# Patient Record
Sex: FEMALE | Race: WHITE | NOT HISPANIC OR LATINO | URBAN - METROPOLITAN AREA
[De-identification: names, ages, dates, MRNs, and addresses within clinical notes are randomized per-mention and may not be internally consistent; named-entity substitution may affect disease eponyms.]

---

## 2019-01-15 PROBLEM — Z00.00 ENCOUNTER FOR PREVENTIVE HEALTH EXAMINATION: Status: ACTIVE | Noted: 2019-01-15

## 2020-11-14 ENCOUNTER — OUTPATIENT (OUTPATIENT)
Dept: OUTPATIENT SERVICES | Facility: HOSPITAL | Age: 70
LOS: 1 days | End: 2020-11-14

## 2020-12-04 ENCOUNTER — NON-APPOINTMENT (OUTPATIENT)
Age: 70
End: 2020-12-04

## 2020-12-04 DIAGNOSIS — M19.90 UNSPECIFIED OSTEOARTHRITIS, UNSPECIFIED SITE: ICD-10-CM

## 2020-12-04 DIAGNOSIS — I10 ESSENTIAL (PRIMARY) HYPERTENSION: ICD-10-CM

## 2020-12-04 DIAGNOSIS — Z87.891 PERSONAL HISTORY OF NICOTINE DEPENDENCE: ICD-10-CM

## 2020-12-04 RX ORDER — CALCITRIOL 0.25 UG/1
0.25 CAPSULE, LIQUID FILLED ORAL
Refills: 0 | Status: ACTIVE | COMMUNITY

## 2020-12-04 RX ORDER — ALLOPURINOL 100 MG/1
100 TABLET ORAL DAILY
Refills: 0 | Status: ACTIVE | COMMUNITY

## 2020-12-04 RX ORDER — OMEPRAZOLE 20 MG/1
20 CAPSULE, DELAYED RELEASE ORAL
Refills: 0 | Status: ACTIVE | COMMUNITY

## 2020-12-04 RX ORDER — INDOMETHACIN 75 MG/1
75 CAPSULE, EXTENDED RELEASE ORAL
Refills: 0 | Status: ACTIVE | COMMUNITY

## 2020-12-04 RX ORDER — DILTIAZEM HYDROCHLORIDE 300 MG/1
300 CAPSULE, EXTENDED RELEASE ORAL
Refills: 0 | Status: ACTIVE | COMMUNITY

## 2021-01-04 ENCOUNTER — RX RENEWAL (OUTPATIENT)
Age: 71
End: 2021-01-04

## 2021-05-13 ENCOUNTER — RX RENEWAL (OUTPATIENT)
Age: 71
End: 2021-05-13

## 2021-09-23 ENCOUNTER — APPOINTMENT (OUTPATIENT)
Dept: PULMONOLOGY | Facility: CLINIC | Age: 71
End: 2021-09-23

## 2021-09-23 ENCOUNTER — NON-APPOINTMENT (OUTPATIENT)
Age: 71
End: 2021-09-23

## 2021-09-23 ENCOUNTER — APPOINTMENT (OUTPATIENT)
Dept: PULMONOLOGY | Facility: CLINIC | Age: 71
End: 2021-09-23
Payer: MEDICARE

## 2021-09-23 VITALS
SYSTOLIC BLOOD PRESSURE: 156 MMHG | HEART RATE: 69 BPM | OXYGEN SATURATION: 97 % | WEIGHT: 230 LBS | DIASTOLIC BLOOD PRESSURE: 78 MMHG | HEIGHT: 62 IN | BODY MASS INDEX: 42.33 KG/M2

## 2021-09-23 DIAGNOSIS — R91.8 OTHER NONSPECIFIC ABNORMAL FINDING OF LUNG FIELD: ICD-10-CM

## 2021-09-23 PROCEDURE — 94010 BREATHING CAPACITY TEST: CPT

## 2021-09-23 PROCEDURE — 94729 DIFFUSING CAPACITY: CPT

## 2021-09-23 PROCEDURE — 94727 GAS DIL/WSHOT DETER LNG VOL: CPT

## 2021-09-23 PROCEDURE — 99214 OFFICE O/P EST MOD 30 MIN: CPT | Mod: 25

## 2021-09-23 NOTE — DISCUSSION/SUMMARY
[FreeTextEntry1] : Ms. Nunn has progressive dyspnea on exertion.  Her history does not seem to indicate worsening obstructive airways disease.  Her PFTs show mild restrictive disease but she does not claim any weight gain.  Her room air O2 saturations are normal.  It is possible that there could be some cardiac dysfunction I have asked her to obtain a recent echocardiogram and compare it with the one done in January 2021.  We will also obtain a CAT scan of the chest to see if the opacity has resolved and to fully define the anatomy as she had no significant pneumonia symptoms at that time.  Patient understands and agrees with this plan of care.

## 2021-09-23 NOTE — PROCEDURE
[FreeTextEntry1] : Room air O2 saturation is 97% at rest room air O2 saturation 95% with exercise.\par \par Chest x-ray 3/20/2021 lingula pneumonia.\par \par Pulmonary function test revealed mild restrictive lung disease.

## 2021-09-23 NOTE — HISTORY OF PRESENT ILLNESS
[Former] : former [Never] : never [TextBox_4] : 70 female with mild inter asthma for yearly fu\par co sob for months  and cant walk for several feet \par no  chest pain or tightness no wheeze no cough no phlegm sl am\par +leg swelling\par no orthopnea only with exertion/stairs \par precipitating factors none\par no wt gain\par lingula pneumonia march covid negative  and treated with zpak and steroids\par no snore +thrashing\par no headache or dry mouth\par no daytime hypersomnolence\par no sedentary events [TextBox_11] : .5 [TextBox_13] : 10 [YearQuit] : 1975

## 2021-09-23 NOTE — REASON FOR VISIT
[Follow-Up] : a follow-up visit [Asthma] : asthma [Pneumonia] : pneumonia [Shortness of Breath] : shortness of breath [TextBox_44] : Pt states that her SOB is worsening - Pt states that this has worsened since her cardiac catheterization in November.  Pt denies any cough or wheeze.  Pt had PFT today.

## 2021-10-01 ENCOUNTER — NON-APPOINTMENT (OUTPATIENT)
Age: 71
End: 2021-10-01

## 2021-10-04 ENCOUNTER — NON-APPOINTMENT (OUTPATIENT)
Age: 71
End: 2021-10-04

## 2021-10-07 ENCOUNTER — NON-APPOINTMENT (OUTPATIENT)
Age: 71
End: 2021-10-07

## 2021-10-11 ENCOUNTER — APPOINTMENT (OUTPATIENT)
Dept: PULMONOLOGY | Facility: CLINIC | Age: 71
End: 2021-10-11
Payer: MEDICARE

## 2021-10-11 VITALS
OXYGEN SATURATION: 96 % | HEIGHT: 62 IN | WEIGHT: 230 LBS | TEMPERATURE: 96.9 F | DIASTOLIC BLOOD PRESSURE: 73 MMHG | HEART RATE: 75 BPM | BODY MASS INDEX: 42.33 KG/M2 | SYSTOLIC BLOOD PRESSURE: 132 MMHG

## 2021-10-11 DIAGNOSIS — M45.9 ANKYLOSING SPONDYLITIS OF UNSPECIFIED SITES IN SPINE: ICD-10-CM

## 2021-10-11 PROCEDURE — 99214 OFFICE O/P EST MOD 30 MIN: CPT

## 2021-10-11 NOTE — HISTORY OF PRESENT ILLNESS
[Former] : former [Never] : never [TextBox_4] : 71 female for fu sob\par feels poor with exertion no cp no tightness\par no wheeze cough or phlegm some am phlegm\par no wt loss no pain\par overall wt stable\par no snore or witness apnea\par no snort  no daytime hypersomnolence\par has not tried albuterol during this time [TextBox_11] : 0.5 [TextBox_13] : 10 [YearQuit] : 1975

## 2021-10-11 NOTE — REASON FOR VISIT
[Follow-Up] : a follow-up visit [Asthma] : asthma [Cough] : cough [Shortness of Breath] : shortness of breath [TextBox_44] : 3 weeks. ECHO, CT and PFT results. Pt reports that she experiences a wet cough and SOB on exertion. No other pulmonary complaints.

## 2021-10-11 NOTE — DISCUSSION/SUMMARY
[FreeTextEntry1] : Ms Bedoya has dyspnea on exertion. The etiology is unclear. Her exam history and pulmonary function test do not seem to correlate with obstructive airways disease as being the cause. I reviewed the CAT scan myself and did not see significant interstitial disease although the patient does have a history of ankylosing spondylitis. Her echocardiogram does show minimal decrease in her left ventricular function but she has RVSP of 49 consistent with pulmonary hypertension. I had a thorough discussion with her regarding pulmonary hypertension and its possible causes. Our first step is to perform a sleep study to see if she has obstructive sleep apnea although she denies all symptoms. If this is -1 must consider pulmonary hypertension from her underlying ankylosing spondylitis and a possible referral to a tertiary care center will be indicated. At this time we will continue her inhalers and albuterol as needed and in fact she will try it with exertion and see if there is any improvement. There is been thoroughly discussed with the patient she understands and agrees\par The patient understands and agrees with plan of care.\par Today's office visit encompassed 32 minutes. I conducted an extensive history ,physical exam and reviewed diagnosis and treatment options  including diagnostic tests,radiologic studies including  cat scans  and the use of prescription medication.

## 2021-10-11 NOTE — PHYSICAL EXAM
[No Acute Distress] : no acute distress [Normal Oropharynx] : normal oropharynx [Normal Appearance] : normal appearance [No Neck Mass] : no neck mass [Normal Rate/Rhythm] : normal rate/rhythm [Normal S1, S2] : normal s1, s2 [No Murmurs] : no murmurs [No Resp Distress] : no resp distress [Clear to Auscultation Bilaterally] : clear to auscultation bilaterally [No Abnormalities] : no abnormalities [Benign] : benign [Normal Gait] : normal gait [No Clubbing] : no clubbing [No Cyanosis] : no cyanosis [No Edema] : no edema [FROM] : FROM [Normal Color/ Pigmentation] : normal color/ pigmentation [No Focal Deficits] : no focal deficits [Oriented x3] : oriented x3 [Normal Affect] : normal affect [TextBox_2] : heavyset female

## 2021-10-11 NOTE — PROCEDURE
[FreeTextEntry1] : CAT scan of chest 9/29/2021 no enlarged lymph nodes endobronchial density peripheral right middle lobe bronchus with patchy density adjacent to the right middle lobe groundglass nodule and some groundglass attenuation right lower lobe my review minimal CAT scan changes\par Echocardiogram mild decrease LV function EF 45 to 50% mild pulmonary hypertension R VSP 49

## 2021-11-12 ENCOUNTER — NON-APPOINTMENT (OUTPATIENT)
Age: 71
End: 2021-11-12

## 2021-11-12 ENCOUNTER — APPOINTMENT (OUTPATIENT)
Dept: OPHTHALMOLOGY | Facility: CLINIC | Age: 71
End: 2021-11-12
Payer: MEDICARE

## 2021-11-12 PROCEDURE — 92014 COMPRE OPH EXAM EST PT 1/>: CPT

## 2021-12-21 ENCOUNTER — NON-APPOINTMENT (OUTPATIENT)
Age: 71
End: 2021-12-21

## 2021-12-29 ENCOUNTER — APPOINTMENT (OUTPATIENT)
Dept: PULMONOLOGY | Facility: CLINIC | Age: 71
End: 2021-12-29
Payer: MEDICARE

## 2021-12-29 VITALS
HEIGHT: 62 IN | BODY MASS INDEX: 44.16 KG/M2 | DIASTOLIC BLOOD PRESSURE: 82 MMHG | SYSTOLIC BLOOD PRESSURE: 162 MMHG | WEIGHT: 240 LBS | OXYGEN SATURATION: 96 % | HEART RATE: 71 BPM | TEMPERATURE: 97.6 F

## 2021-12-29 PROCEDURE — 99214 OFFICE O/P EST MOD 30 MIN: CPT

## 2021-12-29 NOTE — PROCEDURE
[FreeTextEntry1] : Home sleep study reveals AHI of 8.9 consistent with mild obstructive sleep apnea.  Lowest O2 saturation of 79%.

## 2021-12-29 NOTE — DISCUSSION/SUMMARY
[FreeTextEntry1] : Ms. Bedoya has dyspnea on exertion.  She is being treated for obstructive airway disease and her symptoms do not seem to be consistent with worsening asthma.  She did have an elevated right ventricular pressure of 49 on her echocardiogram and an sleep study with mild obstructive apnea with decreased O2 saturation 79%.  Persistent hypoxemia can cause pulmonary hypertension.  However it is only mild on her sleep study and it is not clear that this can definitely lead to pulmonary hypertension.  The patient also is obese and certainly the extra weight alone can cause a dyspnea that she is experiencing.  After extensive discussion with Ms. Nunn the decision was she will be seeing her cardiologist in 2 weeks and will discuss the pulmonary hypertension with her.  I favor pulmonary hypertension evaluation and Dr. Faulkner Fairdealing was recommended.  If he feels the mild obstructive sleep apnea can cause this disease then I recommend therapy.  At this time we will continue the Symbicort and montelukast.  The patient has ankylosing spondylitis and her pulmonary function test revealed minimal restrictive lung disease.  This certainly could be from her weight/body shape as is no definite interstitial lung disease present the patient understands and agrees with this plan of care.\par The patient understands and agrees with plan of care.\par Today's office visit encompassed 32 minutes. I conducted an extensive history ,physical exam and reviewed diagnosis and treatment options  including diagnostic tests,radiologic studies including  cat scans  and the use of prescription medication.

## 2021-12-29 NOTE — HISTORY OF PRESENT ILLNESS
[Former] : former [Never] : never [TextBox_4] : 71 female hx asthma and dyspnea\par feels fair still some dyspnea on exertion  no chest pain no tightness\par no wheeze occa am phlegm\par only with exertion  and walking \par feels good at rest \par no nite awakening\par  [TextBox_11] : .75 [TextBox_13] : 10 [YearQuit] : 1975

## 2021-12-29 NOTE — REASON FOR VISIT
[Follow-Up] : a follow-up visit [Asthma] : asthma [Shortness of Breath] : shortness of breath [TextBox_44] : Pt states that she has had worsened SOB over the past 6 months.  Pt denies any other adverse pulmonary issues.  Pt in office to evaluate recent sleep study - copy scanned to chart.

## 2022-01-05 ENCOUNTER — OUTPATIENT (OUTPATIENT)
Dept: OUTPATIENT SERVICES | Facility: HOSPITAL | Age: 72
LOS: 1 days | End: 2022-01-05

## 2022-01-12 ENCOUNTER — TRANSCRIPTION ENCOUNTER (OUTPATIENT)
Age: 72
End: 2022-01-12

## 2022-01-15 ENCOUNTER — OUTPATIENT (OUTPATIENT)
Dept: OUTPATIENT SERVICES | Facility: HOSPITAL | Age: 72
LOS: 1 days | End: 2022-01-15

## 2022-01-27 DIAGNOSIS — R30.0 DYSURIA: ICD-10-CM

## 2022-03-17 ENCOUNTER — NON-APPOINTMENT (OUTPATIENT)
Age: 72
End: 2022-03-17

## 2022-03-18 ENCOUNTER — APPOINTMENT (OUTPATIENT)
Dept: PULMONOLOGY | Facility: CLINIC | Age: 72
End: 2022-03-18
Payer: MEDICARE

## 2022-03-18 VITALS
HEIGHT: 60 IN | DIASTOLIC BLOOD PRESSURE: 76 MMHG | OXYGEN SATURATION: 93 % | HEART RATE: 66 BPM | TEMPERATURE: 98 F | BODY MASS INDEX: 48.1 KG/M2 | WEIGHT: 245 LBS | SYSTOLIC BLOOD PRESSURE: 158 MMHG | RESPIRATION RATE: 15 BRPM

## 2022-03-18 VITALS
OXYGEN SATURATION: 96 % | HEART RATE: 56 BPM | WEIGHT: 245 LBS | HEIGHT: 60.2 IN | TEMPERATURE: 98.2 F | BODY MASS INDEX: 47.47 KG/M2

## 2022-03-18 DIAGNOSIS — I89.0 LYMPHEDEMA, NOT ELSEWHERE CLASSIFIED: ICD-10-CM

## 2022-03-18 PROCEDURE — 36415 COLL VENOUS BLD VENIPUNCTURE: CPT

## 2022-03-18 PROCEDURE — 94726 PLETHYSMOGRAPHY LUNG VOLUMES: CPT

## 2022-03-18 PROCEDURE — 94618 PULMONARY STRESS TESTING: CPT

## 2022-03-18 PROCEDURE — ZZZZZ: CPT

## 2022-03-18 PROCEDURE — 99215 OFFICE O/P EST HI 40 MIN: CPT | Mod: 25

## 2022-03-18 PROCEDURE — 94060 EVALUATION OF WHEEZING: CPT

## 2022-03-18 PROCEDURE — 99205 OFFICE O/P NEW HI 60 MIN: CPT | Mod: 25

## 2022-03-18 PROCEDURE — 94729 DIFFUSING CAPACITY: CPT

## 2022-03-18 NOTE — PHYSICAL EXAM
[No Acute Distress] : no acute distress [III] : Mallampati Class: III [Normal Appearance] : normal appearance [No Neck Mass] : no neck mass [Normal Rate/Rhythm] : normal rate/rhythm [Normal S1, S2] : normal s1, s2 [No Murmurs] : no murmurs [No Resp Distress] : no resp distress [Clear to Auscultation Bilaterally] : clear to auscultation bilaterally [No Abnormalities] : no abnormalities [Benign] : benign [No Clubbing] : no clubbing [No Cyanosis] : no cyanosis [FROM] : FROM [Normal Color/ Pigmentation] : normal color/ pigmentation [No Focal Deficits] : no focal deficits [Oriented x3] : oriented x3 [Normal Affect] : normal affect [TextBox_99] : waddmaryam gait [TextBox_105] : lymphadema

## 2022-03-18 NOTE — DISCUSSION/SUMMARY
[FreeTextEntry1] : ---Assessment plan----------The patient has been referred here for further opinion regarding pulmonary problem,\par 70 yo with  pulm htn, HTN, obesity, Rose Marie (untreated) ankylosis spondylosis, lymphadema, seasonal asthma\par 1) CT chest and abd/pelvis (given lymphadema)\par 2) labs drawn in our office today\par 3) OSA_ get in lab [facility]\par 4) asthma- continue symbicort - albuterol as needed\par 5) f/u in 6-8 weeks\par 6) obtain cardiac cath results and chest CD for me to review\par \par \par Thanks for allowing  me to participate  in the care of this patient.  Patient at this time  will follow  the above mentioned recommendations and return back for follow up visit. If you have any questions  I can be reached  at # [phone] (office).\par \par Annmarie Monteiro MD, FCCP \par Director, Pulmonary Hypertension Program \par Health system \par Division of Pulmonary, Critical Care and Sleep Medicine \par  Professor of Medicine \par [facility]\par \par TARSHA SaenzC\par

## 2022-03-18 NOTE — REVIEW OF SYSTEMS
[Dyspnea] : dyspnea [SOB on Exertion] : sob on exertion [Negative] : Endocrine [TextBox_94] : as per hpi

## 2022-03-18 NOTE — HISTORY OF PRESENT ILLNESS
[TextBox_4] : This letter  is regarding your patient  who  attended pulmonary out patient office today.  I have reviewed  patient's  past history, social history, family history and medication list. I also  reviewed nurse practitioners/ and fellows  notes and assessment and agree with it.  \par The patient was referred by  for pulm htn, HTN, obesity, Rose Marie (untreated) ankylosis spondylosis, lymphadema, seasonal asthma\par \par  \par ------No history of , fever, chills , rigors, chest pain, or hemoptysis. Questionable history of Raynaud's phenomenon. No h/o significant weight loss in last few months. No history of liver dysfunction , collagen vascular disorder or chronic thromboembolic disease. I would classify the patient's dyspnea as WHO  FUNCTIONAL CLASS II--------\par \par ----Echo  date-2021 est pasp 49mmHg-----\par ----Pft date- 3/2022 normal lung volumes--------\par ----Ct scan date--2021 ground glass attenuation, endobronchial density-----\par ----Cath date--unavailable ([place])----------\par \par Sleep study AHI 8.9\par \par c/o exertional dyspnea since  last summer - Dr Gagandeep hall switched her  from metoprolol to atenolol and this helped with her sleep apnea

## 2022-03-21 DIAGNOSIS — E87.5 HYPERKALEMIA: ICD-10-CM

## 2022-03-21 DIAGNOSIS — E34.9 ENDOCRINE DISORDER, UNSPECIFIED: ICD-10-CM

## 2022-03-21 LAB
25(OH)D3 SERPL-MCNC: 52.8 NG/ML
A1AT SERPL-MCNC: 149 MG/DL
ALBUMIN SERPL ELPH-MCNC: 4.3 G/DL
ALP BLD-CCNC: 111 U/L
ALT SERPL-CCNC: 14 U/L
ANION GAP SERPL CALC-SCNC: 15 MMOL/L
APTT BLD: 25.5 SEC
AST SERPL-CCNC: 11 U/L
BILIRUB SERPL-MCNC: 0.5 MG/DL
BUN SERPL-MCNC: 44 MG/DL
CALCIUM SERPL-MCNC: 10.1 MG/DL
CALCIUM SERPL-MCNC: 10.1 MG/DL
CHLORIDE SERPL-SCNC: 110 MMOL/L
CO2 SERPL-SCNC: 17 MMOL/L
COVID-19 NUCLEOCAPSID  GAM ANTIBODY INTERPRETATION: NEGATIVE
COVID-19 SPIKE DOMAIN ANTIBODY INTERPRETATION: POSITIVE
CREAT SERPL-MCNC: 1.41 MG/DL
CRP SERPL-MCNC: 9 MG/L
DEPRECATED KAPPA LC FREE/LAMBDA SER: 1.32 RATIO
EGFR: 40 ML/MIN/1.73M2
FERRITIN SERPL-MCNC: 69 NG/ML
GLUCOSE SERPL-MCNC: 108 MG/DL
HCYS SERPL-MCNC: 13.6 UMOL/L
IGA SER QL IEP: 110 MG/DL
IGG SER QL IEP: 754 MG/DL
IGM SER QL IEP: 82 MG/DL
INR PPP: 0.98 RATIO
KAPPA LC CSF-MCNC: 1.89 MG/DL
KAPPA LC SERPL-MCNC: 2.5 MG/DL
M TB IFN-G BLD-IMP: NEGATIVE
NT-PROBNP SERPL-MCNC: 183 PG/ML
PARATHYROID HORMONE INTACT: 156 PG/ML
PHOSPHATE SERPL-MCNC: 3.2 MG/DL
POTASSIUM SERPL-SCNC: 5.5 MMOL/L
PROT SERPL-MCNC: 6.4 G/DL
PT BLD: 11.4 SEC
QUANTIFERON TB PLUS MITOGEN MINUS NIL: 9.98 IU/ML
QUANTIFERON TB PLUS NIL: 0.02 IU/ML
QUANTIFERON TB PLUS TB1 MINUS NIL: -0.01 IU/ML
QUANTIFERON TB PLUS TB2 MINUS NIL: -0.01 IU/ML
RHEUMATOID FACT SER QL: 12 IU/ML
SARS-COV-2 AB SERPL IA-ACNC: 206 U/ML
SARS-COV-2 AB SERPL QL IA: 0.17 INDEX
SODIUM SERPL-SCNC: 142 MMOL/L
T3FREE SERPL-MCNC: 2.24 PG/ML
T4 FREE SERPL-MCNC: 1.4 NG/DL
TSH SERPL-ACNC: 1.69 UIU/ML
URATE SERPL-MCNC: 6.6 MG/DL
VIT B12 SERPL-MCNC: 428 PG/ML

## 2022-03-22 LAB
ANACR T: NEGATIVE
CARDIOLIPIN AB SER IA-ACNC: NEGATIVE
CCP AB SER IA-ACNC: <8 UNITS
IGE SER-MCNC: 78 KU/L
RF+CCP IGG SER-IMP: NEGATIVE

## 2022-03-23 LAB
ENA SCL70 IGG SER IA-ACNC: <0.2 AL
ENA SS-A AB SER IA-ACNC: <0.2 AL
ENA SS-B AB SER IA-ACNC: <0.2 AL

## 2022-03-24 ENCOUNTER — APPOINTMENT (OUTPATIENT)
Dept: PULMONOLOGY | Facility: CLINIC | Age: 72
End: 2022-03-24
Payer: MEDICARE

## 2022-03-24 VITALS
BODY MASS INDEX: 44.16 KG/M2 | DIASTOLIC BLOOD PRESSURE: 83 MMHG | WEIGHT: 240 LBS | SYSTOLIC BLOOD PRESSURE: 154 MMHG | TEMPERATURE: 97 F | HEIGHT: 62 IN | OXYGEN SATURATION: 96 % | HEART RATE: 68 BPM

## 2022-03-24 PROCEDURE — 99214 OFFICE O/P EST MOD 30 MIN: CPT

## 2022-03-24 NOTE — REASON FOR VISIT
[Follow-Up] : a follow-up visit [Pulmonary Hypertension] : pulmonary hypertension [Shortness of Breath] : shortness of breath [TextBox_44] : 3 months, SOB on exertion , PFT test done on March 18

## 2022-03-24 NOTE — PHYSICAL EXAM
[No Acute Distress] : no acute distress [Normal Oropharynx] : normal oropharynx [Normal Appearance] : normal appearance [No Neck Mass] : no neck mass [Normal Rate/Rhythm] : normal rate/rhythm [Normal S1, S2] : normal s1, s2 [No Murmurs] : no murmurs [No Resp Distress] : no resp distress [Clear to Auscultation Bilaterally] : clear to auscultation bilaterally [No Abnormalities] : no abnormalities [Benign] : benign [Normal Gait] : normal gait [No Clubbing] : no clubbing [No Cyanosis] : no cyanosis [No Edema] : no edema [FROM] : FROM [Normal Color/ Pigmentation] : normal color/ pigmentation [No Focal Deficits] : no focal deficits [Oriented x3] : oriented x3 [Normal Affect] : normal affect [TextBox_2] : Obese patient no acute respiratory distress

## 2022-03-24 NOTE — DISCUSSION/SUMMARY
[FreeTextEntry1] : Ms. Bedoya has mild pulmonary hypertension on her echo.  She was seen by Dr. Monteiro at [facility] and needed several tests which showed no definite etiology.  He is awaiting a repeat echocardiogram in the future.  The patient feels much better since she stopped metoprolol.  This is a selective beta-blocker and usually does not cause obstructive airways disease but she is overall feeling better so we will no longer use metoprolol.  We will continue the Symbicort and the Singulair as prescribed.  The patient has mild obstructive sleep apnea on a home sleep study.  I recommend positive pressure breathing and the patient understands and agrees.  We will attempt to obtain the equipment (visit several months back up and supplies) and see the patient in approximately 3 months to check on her compliance.  The patient understands and agrees with this plan of care.\par The patient understands and agrees with plan of care.\par Today's office visit encompassed 32 minutes. I conducted an extensive history ,physical exam and reviewed diagnosis and treatment options  including diagnostic tests,radiologic studies including  cat scans  and the use of prescription medication.

## 2022-03-24 NOTE — HISTORY OF PRESENT ILLNESS
[Former] : former [Never] : never [TextBox_4] : 71 female  feels better than prior \par stopped metoprolol and improved  no sob no cough  no wheeze no chest pain no tightness\par Dr Cayetano THOMAS  and stated  no pulm hypertension cause  and for fu echo and ct studies next month\par breathing is improved no wheeze no chest pain\par  [TextBox_11] : .75 [TextBox_13] : 10 [YearQuit] : 1975

## 2022-04-05 LAB — T4 FREE SERPL DIALY-MCNC: 1.4 NG/DL

## 2022-04-26 DIAGNOSIS — R16.0 HEPATOMEGALY, NOT ELSEWHERE CLASSIFIED: ICD-10-CM

## 2022-05-24 ENCOUNTER — APPOINTMENT (OUTPATIENT)
Dept: PULMONOLOGY | Facility: CLINIC | Age: 72
End: 2022-05-24
Payer: MEDICARE

## 2022-05-24 DIAGNOSIS — J45.909 UNSPECIFIED ASTHMA, UNCOMPLICATED: ICD-10-CM

## 2022-05-24 DIAGNOSIS — I27.20 PULMONARY HYPERTENSION, UNSPECIFIED: ICD-10-CM

## 2022-05-24 PROCEDURE — 99443: CPT

## 2022-05-24 NOTE — DISCUSSION/SUMMARY
[FreeTextEntry1] : ---Assessment plan----------The patient has been referred here for further opinion regarding pulmonary problem,\par 72 yo with  pulm htn, HTN, obesity, Mona (untreated) ankylosis spondylosis, lymphadema, seasonal asthma\par 1) CT chest and abd/pelvis (given lymphadema)\par 2)  asthma- continue symbicort - albuterol as needed\par 3) MONA- awaiting cpap ordered by another MD\par 4) obtain cardiac cath results and chest CD for me to review\par 5) pt wishes to continue f/u with DR Gill- declined further f/u with us\par \par \par Thanks for allowing  me to participate  in the care of this patient.  Patient at this time  will follow  the above mentioned recommendations and return back for follow up visit. If you have any questions  I can be reached  at # [phone] (office).\par \par Annmarie Monteiro MD, FCCP \par Director, Pulmonary Hypertension Program \par [facility] \par Division of Pulmonary, Critical Care and Sleep Medicine \par  Professor of Medicine \par [employer] [employer] [employer]\par \par KARYN Saenz-C\par

## 2022-05-24 NOTE — HISTORY OF PRESENT ILLNESS
[Home] : at home, [unfilled] , at the time of the visit. [Medical Office: ([facility])___] : at the medical office located in  [Verbal consent obtained from patient] : the patient, [unfilled] [TextBox_4] : This letter  is regarding your patient  who  attended pulmonary out patient office today.  I have reviewed  patient's  past history, social history, family history and medication list. I also  reviewed nurse practitioners/ and fellows  notes and assessment and agree with it.  \par The patient was referred by  for pulm htn, HTN, obesity, Mona (untreated) ankylosis spondylosis, lymphadema, seasonal asthma\par \par  \par ------No history of , fever, chills , rigors, chest pain, or hemoptysis. Questionable history of Raynaud's phenomenon. No h/o significant weight loss in last few months. No history of liver dysfunction , collagen vascular disorder or chronic thromboembolic disease. I would classify the patient's dyspnea as WHO  FUNCTIONAL CLASS II--------\par \par ----Echo  date-2021 est pasp 49mmHg-----\par ----Pft date- 3/2022 normal lung volumes--------\par ----Ct scan date--2021 ground glass attenuation, endobronchial density-----\par ----Cath date--unavailable ([place])----------\par \par Sleep study AHI 8.9\par \par c/o exertional dyspnea since  last summer - Dr Donis cardiology switched her  from metoprolol to atenolol and this helped with her sleep apnea\par \par 5/24/2022  Asthma stable from resp perspective on symbicort, MONA- awaits cpap

## 2022-06-23 ENCOUNTER — APPOINTMENT (OUTPATIENT)
Dept: PULMONOLOGY | Facility: CLINIC | Age: 72
End: 2022-06-23

## 2022-08-26 ENCOUNTER — OUTPATIENT (OUTPATIENT)
Dept: OUTPATIENT SERVICES | Facility: HOSPITAL | Age: 72
LOS: 1 days | End: 2022-08-26

## 2022-08-26 DIAGNOSIS — R30.0 DYSURIA: ICD-10-CM

## 2022-09-20 ENCOUNTER — APPOINTMENT (OUTPATIENT)
Dept: PULMONOLOGY | Facility: CLINIC | Age: 72
End: 2022-09-20

## 2022-09-20 VITALS
SYSTOLIC BLOOD PRESSURE: 152 MMHG | HEIGHT: 62 IN | WEIGHT: 240 LBS | BODY MASS INDEX: 44.16 KG/M2 | TEMPERATURE: 96.9 F | OXYGEN SATURATION: 97 % | DIASTOLIC BLOOD PRESSURE: 86 MMHG | HEART RATE: 78 BPM

## 2022-09-20 DIAGNOSIS — R06.09 OTHER FORMS OF DYSPNEA: ICD-10-CM

## 2022-09-20 DIAGNOSIS — E66.01 MORBID (SEVERE) OBESITY DUE TO EXCESS CALORIES: ICD-10-CM

## 2022-09-20 PROCEDURE — 99214 OFFICE O/P EST MOD 30 MIN: CPT

## 2022-09-20 RX ORDER — ATENOLOL 25 MG/1
25 TABLET ORAL
Refills: 0 | Status: ACTIVE | COMMUNITY

## 2022-09-20 NOTE — HISTORY OF PRESENT ILLNESS
[Former] : former [Never] : never [CPAP:] : CPAP [Nasal mask] : nasal mask [TextBox_4] : 71 female hx asthma and obstructive sleep apnea  for fu visit\par today feels fair \par co persistent dyspnea  suing apap  and  no change in dyspnea\par no chest pain n tightness\par no wheeze \par cont on symbicort  and used albuterol  when overnite in hotel  and needed albuterol bec of coughing\par  [TextBox_11] : .75 [TextBox_13] : 10 [YearQuit] : [birth year] [TextBox_133] : 75 [TextBox_158] : [facility]

## 2022-09-20 NOTE — DISCUSSION/SUMMARY
[FreeTextEntry1] : Ms. Bedoya has a mild asthma.  She is well controlled on Symbicort and montelukast.  Recent pulmonary function test showed no acute abnormalities.  The patient also has mild sleep apnea.  She has been using machine and denies any change in any symptoms.  She will be mailing her ST card to [employer] and once a compliance report is obtained we will review with her.  I think the majority of her dyspnea on exertion is her obesity.  She is 5 foot 2 and weighs 240 pounds.  An extensive discussion with her regarding weight loss and she will make a concerted effort.\par The patient understands and agrees with plan of care.\par Today's office visit encompassed 32 minutes. I conducted an extensive history ,physical exam and reviewed diagnosis and treatment options  including diagnostic tests,radiologic studies including  cat scans  and the use of prescription medication.

## 2022-09-20 NOTE — REASON FOR VISIT
[Follow-Up] : a follow-up visit [Asthma] : asthma [Sleep Apnea] : sleep apnea [Pulmonary Hypertension] : pulmonary hypertension [Shortness of Breath] : shortness of breath [TextBox_44] : is presenting for a 3 month follow up.  Patient experiencing SOB with exertion, and complains that her CPAP isn't really doing much assist with sleeping.

## 2022-11-04 ENCOUNTER — NON-APPOINTMENT (OUTPATIENT)
Age: 72
End: 2022-11-04

## 2022-11-10 ENCOUNTER — APPOINTMENT (OUTPATIENT)
Dept: PULMONOLOGY | Facility: CLINIC | Age: 72
End: 2022-11-10

## 2022-11-10 VITALS
HEART RATE: 63 BPM | HEIGHT: 62 IN | DIASTOLIC BLOOD PRESSURE: 70 MMHG | OXYGEN SATURATION: 97 % | TEMPERATURE: 97.6 F | SYSTOLIC BLOOD PRESSURE: 145 MMHG | WEIGHT: 240 LBS | BODY MASS INDEX: 44.16 KG/M2

## 2022-11-10 DIAGNOSIS — R93.89 ABNORMAL FINDINGS ON DIAGNOSTIC IMAGING OF OTHER SPECIFIED BODY STRUCTURES: ICD-10-CM

## 2022-11-10 PROCEDURE — 99214 OFFICE O/P EST MOD 30 MIN: CPT

## 2022-11-10 RX ORDER — ALBUTEROL SULFATE 90 UG/1
108 (90 BASE) INHALANT RESPIRATORY (INHALATION)
Qty: 1 | Refills: 3 | Status: ACTIVE | COMMUNITY
Start: 1900-01-01 | End: 1900-01-01

## 2022-11-10 RX ORDER — ALBUTEROL SULFATE 2.5 MG/3ML
(2.5 MG/3ML) SOLUTION RESPIRATORY (INHALATION)
Qty: 120 | Refills: 3 | Status: ACTIVE | COMMUNITY
Start: 1900-01-01 | End: 1900-01-01

## 2022-11-10 RX ORDER — LIDOCAINE 5% 700 MG/1
5 PATCH TOPICAL
Qty: 90 | Refills: 0 | Status: ACTIVE | COMMUNITY
Start: 2022-11-08

## 2022-11-10 RX ORDER — ATENOLOL 50 MG/1
50 TABLET ORAL
Qty: 90 | Refills: 0 | Status: ACTIVE | COMMUNITY
Start: 2022-10-27

## 2022-11-10 RX ORDER — ROSUVASTATIN CALCIUM 5 MG/1
5 TABLET, FILM COATED ORAL
Qty: 90 | Refills: 0 | Status: ACTIVE | COMMUNITY
Start: 2022-10-27

## 2022-11-10 NOTE — PHYSICAL EXAM
[No Acute Distress] : no acute distress [Normal Oropharynx] : normal oropharynx [Normal Appearance] : normal appearance [No Neck Mass] : no neck mass [Normal Rate/Rhythm] : normal rate/rhythm [Normal S1, S2] : normal s1, s2 [No Murmurs] : no murmurs [No Resp Distress] : no resp distress [Clear to Auscultation Bilaterally] : clear to auscultation bilaterally [No Abnormalities] : no abnormalities [Benign] : benign [Normal Gait] : normal gait [No Clubbing] : no clubbing [No Cyanosis] : no cyanosis [FROM] : FROM [Normal Color/ Pigmentation] : normal color/ pigmentation [No Focal Deficits] : no focal deficits [Oriented x3] : oriented x3 [Normal Affect] : normal affect [TextBox_2] : Heavyset patient no respiratory distress [TextBox_105] : Chronic leg edema

## 2022-11-10 NOTE — DISCUSSION/SUMMARY
[FreeTextEntry1] : Ms. Bedoya has asthma and an allergy to cats.  She recently exposed to a cat at her daughter's house and had to use her prednisone for the exacerbation.  The patient was seen in a walk-in clinic and told she had pneumonia.  She did not have any symptoms and pneumonia knows her x-ray showed pneumonia but she completed course of antibiotics.  She is feeling well at this time.  She is back to baseline therapy and I would continue same.  I told her when she is exposed to the cats next time she should take an antihistamine i.e. Zyrtec or Allegra daily to try to blunt the allergic response.\par The patient understands and agrees with plan of care.\par Today's office visit encompassed 32 minutes. I conducted an extensive history ,physical exam and reviewed diagnosis and treatment options  including diagnostic tests,radiologic studies including  cat scans  and the use of prescription medication.

## 2022-11-10 NOTE — REVIEW OF SYSTEMS
[Activity counseling provided] : activity [Keep Food Diary] : Keep food diary [Low Salt Diet] : Low salt diet [Engage in a relaxing activity] : Engage in a relaxing activity [Plan in advance] : Plan in advance [None] : None [Good understanding] : Patient has a good understanding of lifestyle changes and the steps needed to achieve self management goals [Negative] : Endocrine

## 2022-11-10 NOTE — REASON FOR VISIT
[Follow-Up] : a follow-up visit [Asthma] : asthma [Sleep Apnea] : sleep apnea [Pneumonia] : pneumonia [Pulmonary Hypertension] : pulmonary hypertension [TextBox_44] : Pt was diagnosed with pneumonia about 2 weeks ago. Pt states all sx have subsided.

## 2022-11-10 NOTE — HISTORY OF PRESENT ILLNESS
[Former] : former [Never] : never [Obstructive Sleep Apnea] : obstructive sleep apnea [CPAP:] : CPAP [Nasal pillow] : nasal pillow [TextBox_4] : 72 female hx  obstructive sleep apnea and asthma \par visited daughter with cats and used steroids\par at home used neb and dx pneumonia NO fever sl congestion upper chest  and cxr\par minimal phlegm\par no blood no pain\par cxr normal\par today feels good  no sob no cough no wheeze no chest pain no tightness \par  [TextBox_11] : 0.75 [TextBox_13] : 10 [YearQuit] : 1975 [TextBox_158] : MONA

## 2022-11-18 ENCOUNTER — NON-APPOINTMENT (OUTPATIENT)
Age: 72
End: 2022-11-18

## 2022-11-18 ENCOUNTER — APPOINTMENT (OUTPATIENT)
Dept: OPHTHALMOLOGY | Facility: CLINIC | Age: 72
End: 2022-11-18

## 2022-11-18 PROCEDURE — 92014 COMPRE OPH EXAM EST PT 1/>: CPT

## 2023-05-12 ENCOUNTER — APPOINTMENT (OUTPATIENT)
Dept: PULMONOLOGY | Facility: CLINIC | Age: 73
End: 2023-05-12
Payer: MEDICARE

## 2023-05-12 VITALS
OXYGEN SATURATION: 95 % | SYSTOLIC BLOOD PRESSURE: 130 MMHG | HEART RATE: 65 BPM | HEIGHT: 62 IN | TEMPERATURE: 97.2 F | BODY MASS INDEX: 45.08 KG/M2 | DIASTOLIC BLOOD PRESSURE: 80 MMHG | WEIGHT: 245 LBS

## 2023-05-12 PROCEDURE — 99214 OFFICE O/P EST MOD 30 MIN: CPT

## 2023-05-12 RX ORDER — SODIUM ZIRCONIUM CYCLOSILICATE 5 G/5G
5 POWDER, FOR SUSPENSION ORAL
Qty: 44 | Refills: 0 | Status: DISCONTINUED | COMMUNITY
Start: 2022-08-16 | End: 2023-05-12

## 2023-05-12 RX ORDER — DILTIAZEM HYDROCHLORIDE 300 MG/1
300 CAPSULE, EXTENDED RELEASE ORAL
Qty: 180 | Refills: 0 | Status: DISCONTINUED | COMMUNITY
Start: 2022-10-27 | End: 2023-05-12

## 2023-05-12 RX ORDER — AMOXICILLIN 500 MG/1
500 CAPSULE ORAL
Qty: 16 | Refills: 0 | Status: DISCONTINUED | COMMUNITY
Start: 2022-11-08 | End: 2023-05-12

## 2023-05-12 RX ORDER — PREDNISONE 20 MG/1
20 TABLET ORAL
Qty: 10 | Refills: 0 | Status: DISCONTINUED | COMMUNITY
Start: 2022-11-02 | End: 2023-05-12

## 2023-05-12 RX ORDER — CEFPODOXIME PROXETIL 200 MG/1
200 TABLET, FILM COATED ORAL
Qty: 10 | Refills: 0 | Status: DISCONTINUED | COMMUNITY
Start: 2022-11-02 | End: 2023-05-12

## 2023-05-12 RX ORDER — BUDESONIDE AND FORMOTEROL FUMARATE DIHYDRATE 160; 4.5 UG/1; UG/1
160-4.5 AEROSOL RESPIRATORY (INHALATION) TWICE DAILY
Qty: 3 | Refills: 3 | Status: ACTIVE | COMMUNITY
Start: 1900-01-01 | End: 1900-01-01

## 2023-05-12 RX ORDER — DOXYCYCLINE HYCLATE 100 MG/1
100 TABLET ORAL
Qty: 10 | Refills: 0 | Status: DISCONTINUED | COMMUNITY
Start: 2022-11-02 | End: 2023-05-12

## 2023-05-12 RX ORDER — CEPHALEXIN 500 MG/1
500 CAPSULE ORAL
Qty: 10 | Refills: 0 | Status: DISCONTINUED | COMMUNITY
Start: 2022-07-27 | End: 2023-05-12

## 2023-05-12 RX ORDER — LOSARTAN POTASSIUM 100 MG/1
100 TABLET, FILM COATED ORAL
Qty: 90 | Refills: 0 | Status: DISCONTINUED | COMMUNITY
Start: 2022-10-27 | End: 2023-05-12

## 2023-05-12 RX ORDER — AMOXICILLIN AND CLAVULANATE POTASSIUM 875; 125 MG/1; MG/1
875-125 TABLET, COATED ORAL
Qty: 10 | Refills: 0 | Status: DISCONTINUED | COMMUNITY
Start: 2022-08-26 | End: 2023-05-12

## 2023-05-12 RX ORDER — PREDNISONE 5 MG/1
5 TABLET ORAL
Qty: 36 | Refills: 1 | Status: DISCONTINUED | COMMUNITY
Start: 2022-09-20 | End: 2023-05-12

## 2023-05-12 NOTE — DISCUSSION/SUMMARY
[FreeTextEntry1] : Ms Bedoya as COPD.  She is well maintained on the Symbicort and the montelukast.  We will continue this medication.  She is having difficulty using the APAP machine.  She has nasal congestion she had and she is using humidifier.  I discussed with her a trial of the full facemask.  She does not want to pursue this avenue.  Patient also does not have an ST card to or for just a compliance report.  I am going to have our nurse Lina speak to her regarding obtaining compliance studies.\par We will continue all therapy at this time and perform pulmonary function test on her next visit.

## 2023-05-12 NOTE — PHYSICAL EXAM
[No Acute Distress] : no acute distress [Normal Oropharynx] : normal oropharynx [Normal Appearance] : normal appearance [No Neck Mass] : no neck mass [Normal Rate/Rhythm] : normal rate/rhythm [Normal S1, S2] : normal s1, s2 [No Murmurs] : no murmurs [No Resp Distress] : no resp distress [Clear to Auscultation Bilaterally] : clear to auscultation bilaterally [No Abnormalities] : no abnormalities [Benign] : benign [Normal Gait] : normal gait [No Clubbing] : no clubbing [No Cyanosis] : no cyanosis [No Edema] : no edema [FROM] : FROM [Normal Color/ Pigmentation] : normal color/ pigmentation [No Focal Deficits] : no focal deficits [Oriented x3] : oriented x3 [Normal Affect] : normal affect [TextBox_2] : Obese female

## 2023-05-12 NOTE — HISTORY OF PRESENT ILLNESS
[Former] : former [Never] : never [Obstructive Sleep Apnea] : obstructive sleep apnea [CPAP:] : CPAP [Nasal pillow] : nasal pillow [TextBox_4] : 72 female hx asthma cat allergy and  obstructive sleep apnea\par today feels good no sob no cough no wheeze no chest pain no tightness\par no loner has cat exposure\par no issue with seasonal allergies\par full activity  full activities of daily living \par no nite awakening\par using machine nitely but trouble lately\par nasal congestion and using humidifier\par difficult with [employer] re supplies \par no nite snore\par no daytime tired\par no albuterol needs [TextBox_11] : 0.75 [TextBox_13] : 10 [YearQuit] : 1975 [TextBox_158] : [employer]

## 2023-05-12 NOTE — REASON FOR VISIT
[Follow-Up] : a follow-up visit [Asthma] : asthma [Sleep Apnea] : sleep apnea [Pneumonia] : pneumonia [Pulmonary Hypertension] : pulmonary hypertension [TextBox_44] : 6 months. Pt requesting refill on Symbicort and Montelukast. Pt states no pulmonary issues at this time.

## 2023-09-11 ENCOUNTER — NON-APPOINTMENT (OUTPATIENT)
Age: 73
End: 2023-09-11

## 2023-11-16 ENCOUNTER — APPOINTMENT (OUTPATIENT)
Dept: PULMONOLOGY | Facility: CLINIC | Age: 73
End: 2023-11-16
Payer: MEDICARE

## 2023-11-16 VITALS
DIASTOLIC BLOOD PRESSURE: 80 MMHG | HEART RATE: 59 BPM | TEMPERATURE: 97.3 F | OXYGEN SATURATION: 97 % | SYSTOLIC BLOOD PRESSURE: 160 MMHG | BODY MASS INDEX: 42.33 KG/M2 | WEIGHT: 230 LBS | HEIGHT: 62 IN

## 2023-11-16 PROCEDURE — 94729 DIFFUSING CAPACITY: CPT

## 2023-11-16 PROCEDURE — 99214 OFFICE O/P EST MOD 30 MIN: CPT | Mod: 25

## 2023-11-16 PROCEDURE — 94010 BREATHING CAPACITY TEST: CPT

## 2023-11-16 PROCEDURE — 94727 GAS DIL/WSHOT DETER LNG VOL: CPT

## 2023-11-16 RX ORDER — MONTELUKAST 10 MG/1
10 TABLET, FILM COATED ORAL DAILY
Qty: 90 | Refills: 3 | Status: DISCONTINUED | COMMUNITY
End: 2023-11-16

## 2023-11-16 RX ORDER — LOSARTAN POTASSIUM 50 MG/1
50 TABLET, FILM COATED ORAL DAILY
Refills: 0 | Status: DISCONTINUED | COMMUNITY
End: 2023-11-16

## 2023-11-16 RX ORDER — PATIROMER 8.4 G/1
8.4 POWDER, FOR SUSPENSION ORAL
Refills: 0 | Status: DISCONTINUED | COMMUNITY
End: 2023-11-16

## 2023-11-16 RX ORDER — ALBUTEROL SULFATE 2.5 MG/3ML
(2.5 MG/3ML) SOLUTION RESPIRATORY (INHALATION) 4 TIMES DAILY
Qty: 120 | Refills: 3 | Status: ACTIVE | COMMUNITY
Start: 2023-11-16 | End: 1900-01-01

## 2023-12-01 ENCOUNTER — NON-APPOINTMENT (OUTPATIENT)
Age: 73
End: 2023-12-01

## 2023-12-01 ENCOUNTER — APPOINTMENT (OUTPATIENT)
Dept: OPHTHALMOLOGY | Facility: CLINIC | Age: 73
End: 2023-12-01
Payer: MEDICARE

## 2023-12-01 PROCEDURE — 92014 COMPRE OPH EXAM EST PT 1/>: CPT

## 2024-03-18 ENCOUNTER — APPOINTMENT (OUTPATIENT)
Dept: PULMONOLOGY | Facility: CLINIC | Age: 74
End: 2024-03-18
Payer: MEDICARE

## 2024-03-18 VITALS
SYSTOLIC BLOOD PRESSURE: 150 MMHG | TEMPERATURE: 96.6 F | HEIGHT: 62 IN | BODY MASS INDEX: 42.33 KG/M2 | OXYGEN SATURATION: 94 % | WEIGHT: 230 LBS | DIASTOLIC BLOOD PRESSURE: 80 MMHG | HEART RATE: 61 BPM

## 2024-03-18 DIAGNOSIS — J45.30 MILD PERSISTENT ASTHMA, UNCOMPLICATED: ICD-10-CM

## 2024-03-18 DIAGNOSIS — G47.33 OBSTRUCTIVE SLEEP APNEA (ADULT) (PEDIATRIC): ICD-10-CM

## 2024-03-18 PROCEDURE — 99214 OFFICE O/P EST MOD 30 MIN: CPT

## 2024-03-18 RX ORDER — MONTELUKAST 10 MG/1
10 TABLET, FILM COATED ORAL
Qty: 90 | Refills: 3 | Status: ACTIVE | COMMUNITY
Start: 2021-01-04 | End: 1900-01-01

## 2024-03-18 NOTE — DISCUSSION/SUMMARY
[FreeTextEntry1] : Ms. Bedoya has mild persistent asthma.  She is well-maintained on the montelukast 10 mg daily.  She has no albuterol needs of the Symbicort.  We will continue this plan of care.  The patient send her ST caught in from her machine in January and did not know she could still use the machine so she has not used it in 2 months.  A new ST card is being mailed to her as the company lost the initial 1.  We have asked her to place the ST card and use it for 1 week and then mail it back and continue using the machine.  Will see what the compliance was for November or December and then make further decisions.  This been discussed with the patient she understands and agrees. The patient understands and agrees with plan of care. Today's office visit encompassed 32 minutes. I conducted an extensive history, physical exam and reviewed diagnosis and treatment options including diagnostic tests,radiology studies including cat scans and the use of prescription medication.

## 2024-03-18 NOTE — PHYSICAL EXAM
[No Acute Distress] : no acute distress [Normal Oropharynx] : normal oropharynx [Normal Appearance] : normal appearance [No Neck Mass] : no neck mass [Normal Rate/Rhythm] : normal rate/rhythm [Normal S1, S2] : normal s1, s2 [No Murmurs] : no murmurs [No Resp Distress] : no resp distress [Clear to Auscultation Bilaterally] : clear to auscultation bilaterally [No Abnormalities] : no abnormalities [Benign] : benign [Normal Gait] : normal gait [No Clubbing] : no clubbing [No Cyanosis] : no cyanosis [No Edema] : no edema [FROM] : FROM [Normal Color/ Pigmentation] : normal color/ pigmentation [No Focal Deficits] : no focal deficits [Normal Affect] : normal affect [Oriented x3] : oriented x3 [TextBox_2] : Heavyset female no acute respiratory distress

## 2024-03-18 NOTE — REASON FOR VISIT
[Follow-Up] : a follow-up visit [Asthma] : asthma [Sleep Apnea] : sleep apnea [Shortness of Breath] : shortness of breath [TextEntry] : Patient states her asthma has been under control.  Patient uses a CPAP machine, she has to send SD card in to have compliance read.  She sent it in in December 2023.  She still did not recieve her card back.  She called them up and they said it takes a while to be read.

## 2024-03-18 NOTE — HISTORY OF PRESENT ILLNESS
[Former] : former [Never] : never [Obstructive Sleep Apnea] : obstructive sleep apnea [CPAP:] : CPAP [Nasal pillow] : nasal pillow [TextBox_4] : 73 female hx copd and obstructive sleep apneatoday feels good  no sob no cough no wheeze no chest pain no tightness using montelukast 10 mg only no ics and laba no albuterol needs  full  activities of daily living  has lost some wt  20 lb was using apap  and card sent in and await for report stopped machine if no card although feels better of machine [TextBox_11] : .75 [TextBox_13] : 10 [YearQuit] : 1975 [TextBox_158] : [employer]

## 2024-09-19 ENCOUNTER — APPOINTMENT (OUTPATIENT)
Dept: PULMONOLOGY | Facility: CLINIC | Age: 74
End: 2024-09-19
Payer: MEDICARE

## 2024-09-19 VITALS
HEART RATE: 64 BPM | OXYGEN SATURATION: 96 % | DIASTOLIC BLOOD PRESSURE: 82 MMHG | WEIGHT: 230 LBS | SYSTOLIC BLOOD PRESSURE: 126 MMHG | BODY MASS INDEX: 42.33 KG/M2 | TEMPERATURE: 96.1 F | HEIGHT: 62 IN

## 2024-09-19 DIAGNOSIS — G47.33 OBSTRUCTIVE SLEEP APNEA (ADULT) (PEDIATRIC): ICD-10-CM

## 2024-09-19 DIAGNOSIS — J45.30 MILD PERSISTENT ASTHMA, UNCOMPLICATED: ICD-10-CM

## 2024-09-19 DIAGNOSIS — J45.909 UNSPECIFIED ASTHMA, UNCOMPLICATED: ICD-10-CM

## 2024-09-19 PROCEDURE — 99214 OFFICE O/P EST MOD 30 MIN: CPT

## 2024-09-19 RX ORDER — ATENOLOL 100 MG/1
100 TABLET ORAL
Refills: 0 | Status: ACTIVE | COMMUNITY

## 2024-09-19 NOTE — REASON FOR VISIT
[Follow-Up] : a follow-up visit [Asthma] : asthma [Sleep Apnea] : sleep apnea [TextEntry] : Patient states her asthma has been under control.  She does need refills of Symbicort and singulair.  Patient has been using her CPAP.  When she uses it, she wakes up during the night, she does not feel comfortable with anything on her face, when she does not use the CPAP, she sleeps through the night.

## 2024-09-19 NOTE — DISCUSSION/SUMMARY
[FreeTextEntry1] : Ms. Bedoya has mild asthma.  She is using Symbicort as needed and montelukast daily.  I discussed with her that Symbicort is not a as needed medicine and I favor she use the montelukast daily and albuterol as needed and we will hold the Symbicort.  The patient mild obstructive apnea with a question of pulmonary hypertension.  I reviewed her cath report from 2022 but do not see a definite diagnosis of the presence or absence of pulmonary hypertension.  I have sent in email to the physician to see what the final decision is.  The patient states she is using every night but feels no different in fact sleeps better without it.  We will hold the CPAP at this time.  I will also check on why the report compliance report is erroneous. The patient understands and agrees with the plan of care. Today's office visit encompassed 42 minutes. I conducted an extensive history, physical exam and reviewed diagnosis and treatment options including diagnostic tests radiology studies including cat scans and the use of prescription medication.

## 2024-09-19 NOTE — HISTORY OF PRESENT ILLNESS
[Former] : former [Never] : never [Obstructive Sleep Apnea] : obstructive sleep apnea [CPAP:] : CPAP [Nasal pillow] : nasal pillow [TextBox_4] : 73 female hx  and obstructive sleep apnea  feels good  no sob no cough no wheeze no chest pain no tightness full activity  on singulair and symbicort prn [TextBox_11] : .75 [TextBox_13] : 10 [YearQuit] : 1975 [TextBox_158] : [employer]

## 2024-09-19 NOTE — PROCEDURE
[FreeTextEntry1] : Compliance report 4/11/2024 to 7/9/2024   43% of days used 37% of days greater than 4 hours AHI 0.6.  This poor compliance as patient is not using it enough times or hours.  However she states she is using it every night and the enough time hours and that the machine is not registering.

## 2024-09-19 NOTE — PHYSICAL EXAM
[No Acute Distress] : no acute distress [Normal Oropharynx] : normal oropharynx [Normal Appearance] : normal appearance [No Neck Mass] : no neck mass [Normal Rate/Rhythm] : normal rate/rhythm [Normal S1, S2] : normal s1, s2 [No Murmurs] : no murmurs [No Resp Distress] : no resp distress [Clear to Auscultation Bilaterally] : clear to auscultation bilaterally [No Abnormalities] : no abnormalities [Benign] : benign [Normal Gait] : normal gait [No Clubbing] : no clubbing [No Cyanosis] : no cyanosis [FROM] : FROM [3+ Pitting] : 3+ pitting [Normal Color/ Pigmentation] : normal color/ pigmentation [No Focal Deficits] : no focal deficits [Oriented x3] : oriented x3 [Normal Affect] : normal affect

## 2024-10-24 ENCOUNTER — RX RENEWAL (OUTPATIENT)
Age: 74
End: 2024-10-24

## 2024-12-13 ENCOUNTER — APPOINTMENT (OUTPATIENT)
Dept: OPHTHALMOLOGY | Facility: CLINIC | Age: 74
End: 2024-12-13
Payer: MEDICARE

## 2024-12-13 ENCOUNTER — NON-APPOINTMENT (OUTPATIENT)
Age: 74
End: 2024-12-13

## 2024-12-13 PROCEDURE — 92014 COMPRE OPH EXAM EST PT 1/>: CPT

## 2025-03-15 ENCOUNTER — NON-APPOINTMENT (OUTPATIENT)
Age: 75
End: 2025-03-15

## 2025-03-20 ENCOUNTER — APPOINTMENT (OUTPATIENT)
Dept: PULMONOLOGY | Facility: CLINIC | Age: 75
End: 2025-03-20
Payer: MEDICARE

## 2025-03-20 VITALS
SYSTOLIC BLOOD PRESSURE: 152 MMHG | HEIGHT: 62 IN | TEMPERATURE: 97.8 F | HEART RATE: 72 BPM | WEIGHT: 230 LBS | OXYGEN SATURATION: 97 % | BODY MASS INDEX: 42.33 KG/M2 | DIASTOLIC BLOOD PRESSURE: 70 MMHG

## 2025-03-20 DIAGNOSIS — G47.33 OBSTRUCTIVE SLEEP APNEA (ADULT) (PEDIATRIC): ICD-10-CM

## 2025-03-20 DIAGNOSIS — J45.909 UNSPECIFIED ASTHMA, UNCOMPLICATED: ICD-10-CM

## 2025-03-20 PROCEDURE — 94727 GAS DIL/WSHOT DETER LNG VOL: CPT

## 2025-03-20 PROCEDURE — 94010 BREATHING CAPACITY TEST: CPT

## 2025-03-20 PROCEDURE — ZZZZZ: CPT

## 2025-03-20 PROCEDURE — 99214 OFFICE O/P EST MOD 30 MIN: CPT | Mod: 25

## 2025-03-20 PROCEDURE — 94729 DIFFUSING CAPACITY: CPT

## 2025-03-20 RX ORDER — ALBUTEROL SULFATE 2.5 MG/3ML
(2.5 MG/3ML) SOLUTION RESPIRATORY (INHALATION)
Qty: 0 | Refills: 0 | Status: COMPLETED | COMMUNITY
Start: 2025-03-20